# Patient Record
Sex: FEMALE | Race: WHITE | Employment: OTHER | URBAN - METROPOLITAN AREA
[De-identification: names, ages, dates, MRNs, and addresses within clinical notes are randomized per-mention and may not be internally consistent; named-entity substitution may affect disease eponyms.]

---

## 2017-01-24 ENCOUNTER — HOSPITAL ENCOUNTER (OUTPATIENT)
Dept: MAMMOGRAPHY | Age: 72
Discharge: HOME | End: 2017-01-24
Payer: COMMERCIAL

## 2017-01-24 DIAGNOSIS — R92.8 ABNORMAL MAMMOGRAM: ICD-10-CM

## 2017-01-24 PROCEDURE — 77065 DX MAMMO INCL CAD UNI: CPT

## 2020-03-02 ENCOUNTER — HOSPITAL ENCOUNTER (OUTPATIENT)
Dept: MAMMOGRAPHY | Age: 75
Discharge: HOME | End: 2020-03-02
Payer: COMMERCIAL

## 2020-03-02 DIAGNOSIS — Z12.31 VISIT FOR SCREENING MAMMOGRAM: ICD-10-CM

## 2020-03-02 DIAGNOSIS — Z78.0 MENOPAUSE: ICD-10-CM

## 2020-03-02 PROCEDURE — 77080 DXA BONE DENSITY AXIAL: CPT

## 2020-03-02 PROCEDURE — 77067 SCR MAMMO BI INCL CAD: CPT

## 2022-10-30 ENCOUNTER — HOSPITAL ENCOUNTER (EMERGENCY)
Age: 77
Discharge: HOME | End: 2022-10-30
Attending: EMERGENCY MEDICINE
Payer: COMMERCIAL

## 2022-10-30 VITALS
DIASTOLIC BLOOD PRESSURE: 72 MMHG | TEMPERATURE: 98 F | WEIGHT: 106 LBS | RESPIRATION RATE: 14 BRPM | SYSTOLIC BLOOD PRESSURE: 142 MMHG | HEART RATE: 84 BPM | OXYGEN SATURATION: 94 % | BODY MASS INDEX: 18.1 KG/M2 | HEIGHT: 64 IN

## 2022-10-30 DIAGNOSIS — K12.1 MOUTH ULCERS: Primary | ICD-10-CM

## 2022-10-30 LAB
ALBUMIN SERPL-MCNC: 4.1 G/DL (ref 3.5–5)
ALBUMIN/GLOB SERPL: 1.2 {RATIO} (ref 1.1–2.2)
ALP SERPL-CCNC: 90 U/L (ref 45–117)
ALT SERPL-CCNC: 21 U/L (ref 12–78)
ANION GAP SERPL CALC-SCNC: 4 MMOL/L (ref 5–15)
AST SERPL-CCNC: 26 U/L (ref 15–37)
BASOPHILS # BLD: 0 K/UL (ref 0–0.1)
BASOPHILS NFR BLD: 0 % (ref 0–1)
BILIRUB SERPL-MCNC: 0.6 MG/DL (ref 0.2–1)
BUN SERPL-MCNC: 10 MG/DL (ref 6–20)
BUN/CREAT SERPL: 12 (ref 12–20)
CALCIUM SERPL-MCNC: 9.7 MG/DL (ref 8.5–10.1)
CHLORIDE SERPL-SCNC: 100 MMOL/L (ref 97–108)
CO2 SERPL-SCNC: 28 MMOL/L (ref 21–32)
COMMENT, HOLDF: NORMAL
CREAT SERPL-MCNC: 0.86 MG/DL (ref 0.55–1.02)
DIFFERENTIAL METHOD BLD: ABNORMAL
EOSINOPHIL # BLD: 0.2 K/UL (ref 0–0.4)
EOSINOPHIL NFR BLD: 2 % (ref 0–7)
ERYTHROCYTE [DISTWIDTH] IN BLOOD BY AUTOMATED COUNT: 12.6 % (ref 11.5–14.5)
GLOBULIN SER CALC-MCNC: 3.4 G/DL (ref 2–4)
GLUCOSE SERPL-MCNC: 98 MG/DL (ref 65–100)
HCT VFR BLD AUTO: 39.8 % (ref 35–47)
HGB BLD-MCNC: 13.6 G/DL (ref 11.5–16)
IMM GRANULOCYTES # BLD AUTO: 0 K/UL (ref 0–0.04)
IMM GRANULOCYTES NFR BLD AUTO: 1 % (ref 0–0.5)
LYMPHOCYTES # BLD: 0.9 K/UL (ref 0.8–3.5)
LYMPHOCYTES NFR BLD: 13 % (ref 12–49)
MCH RBC QN AUTO: 30.7 PG (ref 26–34)
MCHC RBC AUTO-ENTMCNC: 34.2 G/DL (ref 30–36.5)
MCV RBC AUTO: 89.8 FL (ref 80–99)
MONOCYTES # BLD: 1 K/UL (ref 0–1)
MONOCYTES NFR BLD: 14 % (ref 5–13)
NEUTS SEG # BLD: 5 K/UL (ref 1.8–8)
NEUTS SEG NFR BLD: 70 % (ref 32–75)
NRBC # BLD: 0 K/UL (ref 0–0.01)
NRBC BLD-RTO: 0 PER 100 WBC
PLATELET # BLD AUTO: 229 K/UL (ref 150–400)
PMV BLD AUTO: 10.7 FL (ref 8.9–12.9)
POTASSIUM SERPL-SCNC: 4 MMOL/L (ref 3.5–5.1)
PROT SERPL-MCNC: 7.5 G/DL (ref 6.4–8.2)
RBC # BLD AUTO: 4.43 M/UL (ref 3.8–5.2)
SAMPLES BEING HELD,HOLD: NORMAL
SODIUM SERPL-SCNC: 132 MMOL/L (ref 136–145)
WBC # BLD AUTO: 7.1 K/UL (ref 3.6–11)

## 2022-10-30 PROCEDURE — 85025 COMPLETE CBC W/AUTO DIFF WBC: CPT

## 2022-10-30 PROCEDURE — 80053 COMPREHEN METABOLIC PANEL: CPT

## 2022-10-30 PROCEDURE — 74011000250 HC RX REV CODE- 250: Performed by: EMERGENCY MEDICINE

## 2022-10-30 PROCEDURE — 36415 COLL VENOUS BLD VENIPUNCTURE: CPT

## 2022-10-30 PROCEDURE — 99283 EMERGENCY DEPT VISIT LOW MDM: CPT

## 2022-10-30 RX ORDER — LIDOCAINE HYDROCHLORIDE 20 MG/ML
15 SOLUTION OROPHARYNGEAL
Status: COMPLETED | OUTPATIENT
Start: 2022-10-30 | End: 2022-10-30

## 2022-10-30 RX ADMIN — Medication 15 ML: at 13:22

## 2022-10-30 NOTE — ED PROVIDER NOTES
[facility]   72-year-old woman who presents to the emergency department due to mouth lesions. Early last week she was scratched by a cat on her left hand. She says the wound was not getting better so she went to an urgent care and was prescribed Bactrim. The day after taking the Bactrim she developed swollen lips and painful mouth lesions. She subsequently developed a sore throat feel like there is \"broccoli in her throat when she swallows. She denies any pruritus or rash on her extremities. No bullae on her skin. No vaginal involvement. No fevers or chills. No body aches. She does not have a known allergy to Bactrim. She says the injury to her left hand is significantly improved. Past Medical History:   Diagnosis Date    Menopause 2000    Osteoporosis        Past Surgical History:   Procedure Laterality Date    HX HYSTERECTOMY  2000    HX OOPHORECTOMY Bilateral 2000         No family history on file.     Social History     Socioeconomic History    Marital status:      Spouse name: Not on file    Number of children: Not on file    Years of education: Not on file    Highest education level: Not on file   Occupational History    Not on file   Tobacco Use    Smoking status: Not on file    Smokeless tobacco: Not on file   Substance and Sexual Activity    Alcohol use: Not on file    Drug use: Not on file    Sexual activity: Not on file   Other Topics Concern    Not on file   Social History Narrative    Not on file     Social Determinants of Health     Financial Resource Strain: Not on file   Food Insecurity: Not on file   Transportation Needs: Not on file   Physical Activity: Not on file   Stress: Not on file   Social Connections: Not on file   Intimate Partner Violence: Not on file   Housing Stability: Not on file         ALLERGIES: Sulfa (sulfonamide antibiotics)    Review of Systems  A complete review of systems was performed and all systems were negative unless otherwise documented in the [facility]    Vitals: 10/30/22 1142   BP: (!) 142/72   Pulse: 84   Resp: 14   Temp: 98 °F (36.7 °C)   SpO2: 94%   Weight: 48.1 kg (106 lb)   Height: 5' 4\" (1.626 m)            Physical Exam  Constitutional:       Comments: Resting comfortably in no acute distress   HENT:      Mouth/Throat:      Comments: Moist mucous membranes. Outside of the lips are mildly swollen but no cheilitis noted. Patient has aphthous appearing lesions on the inner aspect of the upper and lower lips that is worse in the lower lips. No posterior oropharyngeal swelling or erythema. No exudates. Phonating normally. Eyes:      General: No scleral icterus. Extraocular Movements: Extraocular movements intact. Conjunctiva/sclera: Conjunctivae normal.   Neck:      Comments: No anterior cervical neck tenderness  Pulmonary:      Effort: Pulmonary effort is normal. No respiratory distress. Breath sounds: Normal breath sounds. No wheezing or rales. Abdominal:      General: There is no distension. Palpations: Abdomen is soft. Tenderness: There is no abdominal tenderness. Musculoskeletal:         General: No deformity. Normal range of motion. Cervical back: Normal range of motion. Right lower leg: No edema. Left lower leg: No edema. Skin:     General: Skin is warm and dry. Comments: No rashes or blisters on the trunk, back, bilateral upper and lower extremities   Neurological:      Comments: Awake and alert. GCS 15        MDM  68-year-old woman who presents with the above chief complaint. She has stable vital signs. She has aphthous looking ulcers on the inside of her upper and lower lips. Her lips are mildly swollen but she does not have any cheilitis. She is phonating normally. She has no neck tenderness. Her posterior oropharynx appears clear.   I did get labs and these were all normal.  We attempted to reach out to the [facility] dermatology physician on-call given that the patient's symptoms started after taking Bactrim. My concern was that this could possibly be representative of early SJS. However she does not have any prodrome with symptoms that would be consistent of that and she does not have any other exam findings consistent with SJS. We were unable to get a hold of [facility], but I instructed the patient to discontinue the Bactrim. A small lesion on her hand does not appear infected and no think she needs antibiotics for this. Patient will be prescribed some Magic mouthwash. She was instructed to return with any worsening symptoms. She understands the plan going forward and patient discharged in stable condition.        Procedures

## 2022-10-30 NOTE — DISCHARGE INSTRUCTIONS
Please stop the Bactrim. Take the medication I prescribed you as directed and you can also  some Orajel.   If you get worse or develop blisters or rash on your body please go straight to the [facility] ER

## 2022-10-30 NOTE — ED TRIAGE NOTES
Pt arrives ambulatory c/o possible reaction to Sulfa antibx she is taking for a cat scratch last week. Pt states she is having swallowing issues like  \"broccoli in my throat and my lips are swollen with blisters\". Pt denies itching or rash elsewhere, it is limited to mouth only. Pt has has no other significant PMH, is AOx4 in no apparent distress. Pt endorses daily alcohol use (\"2 drinks a day\").

## 2023-01-25 ENCOUNTER — TRANSCRIBE ORDER (OUTPATIENT)
Dept: SCHEDULING | Age: 78
End: 2023-01-25

## 2023-01-25 DIAGNOSIS — M81.0 OSTEOPOROSIS: Primary | ICD-10-CM

## 2023-03-10 ENCOUNTER — TRANSCRIBE ORDER (OUTPATIENT)
Dept: SCHEDULING | Age: 78
End: 2023-03-10

## 2023-03-10 DIAGNOSIS — Z12.31 SCREENING MAMMOGRAM FOR HIGH-RISK PATIENT: Primary | ICD-10-CM

## 2023-03-16 ENCOUNTER — HOSPITAL ENCOUNTER (OUTPATIENT)
Dept: BONE DENSITY | Age: 78
Discharge: HOME | End: 2023-03-16
Attending: INTERNAL MEDICINE
Payer: COMMERCIAL

## 2023-03-16 ENCOUNTER — TRANSCRIBE ORDER (OUTPATIENT)
Dept: GENERAL RADIOLOGY | Age: 78
End: 2023-03-16

## 2023-03-16 ENCOUNTER — HOSPITAL ENCOUNTER (OUTPATIENT)
Dept: MAMMOGRAPHY | Age: 78
Discharge: HOME | End: 2023-03-16
Attending: INTERNAL MEDICINE
Payer: COMMERCIAL

## 2023-03-16 DIAGNOSIS — M81.0 OSTEOPOROSIS: ICD-10-CM

## 2023-03-16 DIAGNOSIS — R92.8 ABNORMAL MAMMOGRAM: Primary | ICD-10-CM

## 2023-03-16 DIAGNOSIS — Z12.31 SCREENING MAMMOGRAM FOR HIGH-RISK PATIENT: ICD-10-CM

## 2023-03-16 PROCEDURE — 77063 BREAST TOMOSYNTHESIS BI: CPT

## 2023-03-16 PROCEDURE — 77080 DXA BONE DENSITY AXIAL: CPT

## 2023-04-03 ENCOUNTER — HOSPITAL ENCOUNTER (OUTPATIENT)
Dept: MAMMOGRAPHY | Age: 78
End: 2023-04-03
Attending: INTERNAL MEDICINE
Payer: COMMERCIAL

## 2023-04-03 DIAGNOSIS — R92.8 ABNORMAL MAMMOGRAM: ICD-10-CM

## 2023-04-03 PROCEDURE — 77061 BREAST TOMOSYNTHESIS UNI: CPT

## 2023-04-23 DIAGNOSIS — R92.8 ABNORMAL MAMMOGRAM: Primary | ICD-10-CM

## 2023-04-29 ENCOUNTER — APPOINTMENT (OUTPATIENT)
Dept: GENERAL RADIOLOGY | Age: 78
End: 2023-04-29
Attending: EMERGENCY MEDICINE
Payer: COMMERCIAL

## 2023-04-29 ENCOUNTER — HOSPITAL ENCOUNTER (EMERGENCY)
Age: 78
Discharge: HOME | End: 2023-04-29
Attending: EMERGENCY MEDICINE
Payer: COMMERCIAL

## 2023-04-29 VITALS
TEMPERATURE: 98 F | BODY MASS INDEX: 18.25 KG/M2 | HEIGHT: 64 IN | RESPIRATION RATE: 22 BRPM | SYSTOLIC BLOOD PRESSURE: 165 MMHG | WEIGHT: 106.92 LBS | DIASTOLIC BLOOD PRESSURE: 93 MMHG | OXYGEN SATURATION: 96 % | HEART RATE: 77 BPM

## 2023-04-29 DIAGNOSIS — T18.108A FOREIGN BODY IN ESOPHAGUS, INITIAL ENCOUNTER: Primary | ICD-10-CM

## 2023-04-29 LAB
COMMENT, HOLDF: NORMAL
SAMPLES BEING HELD,HOLD: NORMAL

## 2023-04-29 PROCEDURE — 96375 TX/PRO/DX INJ NEW DRUG ADDON: CPT

## 2023-04-29 PROCEDURE — 99284 EMERGENCY DEPT VISIT MOD MDM: CPT

## 2023-04-29 PROCEDURE — 71045 X-RAY EXAM CHEST 1 VIEW: CPT

## 2023-04-29 PROCEDURE — 74011250636 HC RX REV CODE- 250/636: Performed by: EMERGENCY MEDICINE

## 2023-04-29 PROCEDURE — 96374 THER/PROPH/DIAG INJ IV PUSH: CPT

## 2023-04-29 RX ORDER — ONDANSETRON 2 MG/ML
4 INJECTION INTRAMUSCULAR; INTRAVENOUS
Status: COMPLETED | OUTPATIENT
Start: 2023-04-29 | End: 2023-04-29

## 2023-04-29 RX ADMIN — GLUCAGON 1 MG: KIT at 06:18

## 2023-04-29 RX ADMIN — ONDANSETRON 4 MG: 2 INJECTION INTRAMUSCULAR; INTRAVENOUS at 06:28

## 2023-04-29 NOTE — ED TRIAGE NOTES
Patient arrived via EMS from w/ c/o aspiration due to consuming an one acetaminophen pill this morning. Patient denies N/V. Patient sat 100% on room air.

## 2023-04-29 NOTE — ED PROVIDER NOTES
Patient is a 72-year-old female with history of osteoporosis who presents with choking. Patient reports that she woke up to use the bathroom. Noted she was having headache and took a headache pill. Patient reports it was small and coated, but it is stuck in her throat. She has been gagging for an hour right now. She says she is having difficulty breathing and called EMS. On arrival, patient noted to be gagging and distress. She is handling secretions and her vital signs are stable. She says she has never had an episode of choking before. Past Medical History:   Diagnosis Date    History of hormone replacement therapy     History of testosterone    Menopause 2000    Osteoporosis        Past Surgical History:   Procedure Laterality Date    HX HYSTERECTOMY  2000    HX OOPHORECTOMY Bilateral 2000         No family history on file. Social History     Socioeconomic History    Marital status:      Spouse name: Not on file    Number of children: Not on file    Years of education: Not on file    Highest education level: Not on file   Occupational History    Not on file   Tobacco Use    Smoking status: Not on file    Smokeless tobacco: Not on file   Substance and Sexual Activity    Alcohol use: Not on file    Drug use: Not on file    Sexual activity: Not on file   Other Topics Concern    Not on file   Social History Narrative    Not on file     Social Determinants of Health     Financial Resource Strain: Not on file   Food Insecurity: Not on file   Transportation Needs: Not on file   Physical Activity: Not on file   Stress: Not on file   Social Connections: Not on file   Intimate Partner Violence: Not on file   Housing Stability: Not on file         ALLERGIES: Sulfa (sulfonamide antibiotics)    Review of Systems   Constitutional:  Negative for chills and fever. HENT:  Negative for drooling and nosebleeds. Eyes:  Negative for pain and itching. Respiratory:  Positive for choking.  Negative for stridor. Cardiovascular:  Negative for leg swelling. Gastrointestinal:  Negative for abdominal distention and rectal pain. Endocrine: Negative for heat intolerance and polyphagia. Genitourinary:  Negative for enuresis and genital sores. Musculoskeletal:  Negative for arthralgias and joint swelling. Skin:  Negative for color change. Allergic/Immunologic: Negative for immunocompromised state. Neurological:  Negative for tremors and speech difficulty. Hematological:  Negative for adenopathy. Psychiatric/Behavioral:  Negative for dysphoric mood and sleep disturbance. Vitals:    04/29/23 0609 04/29/23 0641 04/29/23 0700 04/29/23 0715   BP: (!) 186/106  (!) 169/100 (!) 165/93   Pulse: 94  75 77   Resp: 15  16 22   Temp: 98 °F (36.7 °C)      SpO2: 100% 100% 95% 96%   Weight: 48.5 kg (106 lb 14.8 oz)      Height: 5' 4\" (1.626 m)               Physical Exam  Vitals and nursing note reviewed. Constitutional:       General: She is in acute distress. Appearance: She is well-developed. She is not toxic-appearing or diaphoretic. HENT:      Head: Normocephalic. Nose: Nose normal.      Mouth/Throat:      Pharynx: Oropharynx is clear. No oropharyngeal exudate or posterior oropharyngeal erythema. Comments: No pooling secretions in posterior oropharynx. Eyes:      Conjunctiva/sclera: Conjunctivae normal.   Cardiovascular:      Rate and Rhythm: Normal rate and regular rhythm. Pulmonary:      Effort: Pulmonary effort is normal. No respiratory distress. Abdominal:      General: There is no distension. Palpations: Abdomen is soft. Musculoskeletal:         General: No deformity. Normal range of motion. Cervical back: Normal range of motion and neck supple. Skin:     General: Skin is warm and dry. Neurological:      Mental Status: She is alert.       Coordination: Coordination normal.   Psychiatric:         Behavior: Behavior normal.        Medical Decision Making  Patient arrives in distress and reports that there is a pill stuck in her throat that she took for headache prior to arrival.  She is handling secretions, but gurgling sound heard, and every few minutes she brings up saliva. Concern for unstable airway and pt being monitored by me and respiratory therapist for prolonged period of time. Her vital signs are stable, there is no foreign body noted in the posterior oropharynx on my exam.  Attempted IV glucagon followed by juice and patient reports that she thinks the pill has passed and she was able to tolerate p.o. subsequently. Denies any prior history of difficulty swallowing. We will follow-up with her PCP for symptom recheck. I recommended she eat soft foods until she does. Discharge. Amount and/or Complexity of Data Reviewed  Independent Historian: spouse  External Data Reviewed: notes. Radiology: ordered and independent interpretation performed. Decision-making details documented in ED Course. Risk  Prescription drug management. Critical Care  Total time providing critical care: 65 minutes         Procedures    Patient's results have been reviewed with them. Patient and/or family have verbally conveyed their understanding and agreement of the patient's signs, symptoms, diagnosis, treatment and prognosis and additionally agree to follow up as recommended or return to the Emergency Room should their condition change prior to follow-up. Discharge instructions have also been provided to the patient with some educational information regarding their diagnosis as well a list of reasons why they would want to return to the ER prior to their follow-up appointment should their condition change.